# Patient Record
Sex: FEMALE | Race: WHITE
[De-identification: names, ages, dates, MRNs, and addresses within clinical notes are randomized per-mention and may not be internally consistent; named-entity substitution may affect disease eponyms.]

---

## 2018-05-08 ENCOUNTER — HOSPITAL ENCOUNTER (OUTPATIENT)
Dept: HOSPITAL 17 - HSDC | Age: 58
Discharge: HOME | End: 2018-05-08
Attending: OBSTETRICS & GYNECOLOGY
Payer: COMMERCIAL

## 2018-05-08 VITALS
OXYGEN SATURATION: 95 % | TEMPERATURE: 97.3 F | RESPIRATION RATE: 18 BRPM | DIASTOLIC BLOOD PRESSURE: 69 MMHG | SYSTOLIC BLOOD PRESSURE: 158 MMHG | HEART RATE: 68 BPM

## 2018-05-08 VITALS — BODY MASS INDEX: 26.4 KG/M2 | WEIGHT: 174.17 LBS | HEIGHT: 68 IN

## 2018-05-08 DIAGNOSIS — D25.1: Primary | ICD-10-CM

## 2018-05-08 DIAGNOSIS — N72: ICD-10-CM

## 2018-05-08 DIAGNOSIS — R94.31: ICD-10-CM

## 2018-05-08 DIAGNOSIS — N84.0: ICD-10-CM

## 2018-05-08 DIAGNOSIS — N87.0: ICD-10-CM

## 2018-05-08 DIAGNOSIS — R10.2: ICD-10-CM

## 2018-05-08 LAB
BASOPHILS # BLD AUTO: 0.1 TH/MM3 (ref 0–0.2)
BASOPHILS NFR BLD: 1.2 % (ref 0–2)
EOSINOPHIL # BLD: 0.2 TH/MM3 (ref 0–0.4)
EOSINOPHIL NFR BLD: 3.3 % (ref 0–4)
ERYTHROCYTE [DISTWIDTH] IN BLOOD BY AUTOMATED COUNT: 12.3 % (ref 11.6–17.2)
HCT VFR BLD CALC: 43.1 % (ref 35–46)
HGB BLD-MCNC: 14.8 GM/DL (ref 11.6–15.3)
LYMPHOCYTES # BLD AUTO: 1.4 TH/MM3 (ref 1–4.8)
LYMPHOCYTES NFR BLD AUTO: 22.8 % (ref 9–44)
MCH RBC QN AUTO: 36.3 PG (ref 27–34)
MCHC RBC AUTO-ENTMCNC: 34.5 % (ref 32–36)
MCV RBC AUTO: 105.2 FL (ref 80–100)
MONOCYTE #: 0.5 TH/MM3 (ref 0–0.9)
MONOCYTES NFR BLD: 8.7 % (ref 0–8)
NEUTROPHILS # BLD AUTO: 4 TH/MM3 (ref 1.8–7.7)
NEUTROPHILS NFR BLD AUTO: 64 % (ref 16–70)
PLATELET # BLD: 188 TH/MM3 (ref 150–450)
PMV BLD AUTO: 8.9 FL (ref 7–11)
RBC # BLD AUTO: 4.09 MIL/MM3 (ref 4–5.3)
WBC # BLD AUTO: 6.2 TH/MM3 (ref 4–11)

## 2018-05-08 PROCEDURE — 58552 LAPARO-VAG HYST INCL T/O: CPT

## 2018-05-08 PROCEDURE — 93005 ELECTROCARDIOGRAM TRACING: CPT

## 2018-05-08 PROCEDURE — 86900 BLOOD TYPING SEROLOGIC ABO: CPT

## 2018-05-08 PROCEDURE — 84702 CHORIONIC GONADOTROPIN TEST: CPT

## 2018-05-08 PROCEDURE — 00840 ANES IPER PX LOWER ABD NOS: CPT

## 2018-05-08 PROCEDURE — 86901 BLOOD TYPING SEROLOGIC RH(D): CPT

## 2018-05-08 PROCEDURE — 85025 COMPLETE CBC W/AUTO DIFF WBC: CPT

## 2018-05-08 PROCEDURE — 88307 TISSUE EXAM BY PATHOLOGIST: CPT

## 2018-05-08 PROCEDURE — 86850 RBC ANTIBODY SCREEN: CPT

## 2018-05-08 NOTE — PD.OP
__________________________________________________





Operative Report


Date of Surgery:  May 8, 2018


Preoperative Diagnosis:  


(1) Intramural leiomyoma of uterus


Postoperative Diagnosis:  


(1) Intramural leiomyoma of uterus


Procedure:


[facility] BSO


Surgeon:


Alo Hopkins


Assistant(s):


Alo Bond MD May 8, 2018 12:08

## 2018-05-08 NOTE — EKG
Date Performed: 05/08/2018       Time Performed: 07:22:09

 

PTAGE:      58 years

 

EKG:      Sinus rhythm 

 

 MODERATE INTRAVENTRICULAR CONDUCTION DELAY ABNORMAL ECG 

 

NO PREVIOUS TRACING            

 

DOCTOR:   Wayne Day  Interpretating Date/Time  05/08/2018 10:46:38

## 2018-05-08 NOTE — MP
cc:

Alo Hopkins MD

****

 

 

DATE OF OPERATION:

05/08/2018

 

DATE OF PROCEDURE:

05/08/2018

 

PROCEDURE PERFORMED:

Laparoscopic assisted vaginal hysterectomy and bilateral 

salpingo-oophorectomy.

 

PREOPERATIVE DIAGNOSES:

Intramural fibroids, pelvic pain.

 

POSTOPERATIVE DIAGNOSES:

Intramural fibroids, pelvic pain.

 

SURGEON:

Alo Hopkins MD

 

ESTIMATED BLOOD LOSS:

150 mL.

 

ASSISTANT:

FIDEL Blanco

 

ANESTHESIA:

General, Dr. Connor.

 

PROCEDURE IN DETAIL:

After informed consent, the patient was taken to the operating room 

where she was placed under general anesthesia, placed in supine 

position, legs in the [facility].  Abdomen, perineum and 

vagina were prepped and draped in normal sterile fashion.  A timeout 

was taken.  Everyone agreed with the procedure.  A speculum was placed

in the vagina.  The cervix was grasped with a single-tooth tenaculum 

and a HUMI uterine manipulator was placed into the uterus without 

difficulty.  Powers catheter was also placed to gravity.  Gloves were 

changed and 5 mm infraumbilical incision was then made after injecting

0.25% Marcaine with epinephrine, and we entered the abdomen under 

direct visualization.  Once we entered the abdomen, a survey of the 

upper and lower abdomen was normal.  The left lower quadrant and right

lower quadrant 5 mm trocars were placed without difficulty.  At this 

point, the survey revealed normal uterus, normal ovaries which were 

atrophic, postmenopausal.  She has a large fibroid uterus.  We used 

our Harmonic scalpel, came across both infundibulopelvic ligaments and

down the broad ligament to the level of the round ligament.  Once the 

round ligament was taken, the uterus was amputated from the pelvic 

side walls, it was attached to the uterus to be removed.  We continued

down the broad ligament down to the level of a bladder flap.  Created 

a bladder flap, dissecting the bladder off the lower uterine segment 

and the cervix pushed away over the edge of the cervix.  We then took 

the uterine arteries bilaterally.  Once the uterine arteries had been 

taken and the uterus was well blanched, we changed our attention to 

the vaginal portion of the case.  We incised the anterior portion of 

the cervix, down to the level of the fascia, dissected until we met 

our incision from above.  The abdomen was entered at this point.  

Posterior cul-de-sac was easily entered with a single pass with Layne 

scissors.  Once we were in the posterior cul-de-sac, the posterior 

peritoneum was attached to the pelvic cuff.  We then used Vipul 

clamps to clamp, cut, and tie the uterosacral and cardinal ligaments. 

These were tagged.  We continued to grab the cervical branches of the 

uterine arteries and suture ligate them with a Vipul clamp and 

Vicryl.  Once we reached the incision from above, the uterus was 

amputated.  It could not be removed secondary to its size and the 

enlarged intramural fibroid, so the cervix was amputated and the 

uterus was bivalved and portions of the fibroid was removed and 

finally the uterus was removed from the abdomen without difficulty.  

No bleeding was occurring.  The posterior cuff was run with a running 

locking stitch of 0 Vicryl suture.  The angles were sutured well with 

Vicryl suture and then the vaginal cuff was closed with 4 

figure-of-eight sutures.  Good hemostasis was achieved at all pedicles

and suture lines.  At this point, gloves were changed again, the 

abdomen was reinsufflated.  We surveyed all pedicles, noted to be 

hemostatic, irrigated out the pelvis, removed all of the fluid.  Once 

again, there was no active bleeding.  The upper abdomen was normal.  

All instruments were removed from the abdomen.  The sponge stick was 

removed from the vagina.  The vaginal cuff is well healed and not 

bleeding, and the 5 mm ports were closed with simple stitch of 

Monocryl.  The patient tolerated the procedure well.  She was taken to

the recovery room in stable condition.  She requested discharge home 

today, which I think is reasonable.  Patient was very dry at the end 

of the case.  She will call me for any difficulty.  She will follow up

in 2 weeks.

 

 

__________________________________

MD BENJAMÍN Hernandez/MARGE

D: 05/08/2018, 12:13 PM

T: 05/08/2018, 12:36 PM

Visit #: [id number]

Job #: [id number]

## 2018-05-08 NOTE — HHI.DCPOC
Discharge Care Plan


Diagnosis:  


(1) Intramural leiomyoma of uterus


Report Symptoms to Your Doctor


-Temperature above 100.5 degrees


-Redness, of incision or excessive or foul smelling drainage


-Unusual pain or calf pain


-Increased vaginal bleeding


-Painful or difficulty urinating


-Feelings of extreme sadness or anxiety after 2 weeks


Goals to Promote Your Health


* To prevent worsening of your condition and complications


* To maintain your health at the optimal level


Directions to Meet Your Goals


*** Take your medications as prescribed


*** Follow your dietary instruction


*** Follow activity as directed


*** Ensure plenty of rest for recovery


*** Drink fluids for hydration








*** Keep your appointments as scheduled


*** Take your immunizations and boosters as scheduled


*** If your symptoms worsen call your PCP, if no PCP go to Urgent Care Center 

or Emergency Room***


*** Smoking is Dangerous to Your Health. Avoid second hand smoke***


***Call the 24-hour crisis hotline for domestic abuse at 1-255.276.2613***











Alo Hopkins MD May 8, 2018 12:06